# Patient Record
Sex: MALE | Race: WHITE | NOT HISPANIC OR LATINO | URBAN - METROPOLITAN AREA
[De-identification: names, ages, dates, MRNs, and addresses within clinical notes are randomized per-mention and may not be internally consistent; named-entity substitution may affect disease eponyms.]

---

## 2020-10-06 ENCOUNTER — OFFICE VISIT (OUTPATIENT)
Dept: URBAN - METROPOLITAN AREA CLINIC 23 | Facility: CLINIC | Age: 80
End: 2020-10-06
Payer: MEDICARE

## 2020-10-06 DIAGNOSIS — K51.00 ULCERATIVE CHRONIC PANCOLITIS: ICD-10-CM

## 2020-10-06 PROCEDURE — G8420 CALC BMI NORM PARAMETERS: HCPCS | Performed by: INTERNAL MEDICINE

## 2020-10-06 PROCEDURE — G8482 FLU IMMUNIZE ORDER/ADMIN: HCPCS | Performed by: INTERNAL MEDICINE

## 2020-10-06 PROCEDURE — 99213 OFFICE O/P EST LOW 20 MIN: CPT | Performed by: INTERNAL MEDICINE

## 2020-10-06 PROCEDURE — G8427 DOCREV CUR MEDS BY ELIG CLIN: HCPCS | Performed by: INTERNAL MEDICINE

## 2020-10-06 PROCEDURE — G9903 PT SCRN TBCO ID AS NON USER: HCPCS | Performed by: INTERNAL MEDICINE

## 2020-10-06 RX ORDER — ALFUZOSIN HYDROCHLORIDE 10 MG/1
1 TABLET IMMEDIATELY AFTER THE SAME MEAL TABLET, FILM COATED, EXTENDED RELEASE ORAL ONCE A DAY
Status: ACTIVE | COMMUNITY

## 2020-10-06 RX ORDER — TRAMADOL HYDROCHLORIDE 50 MG/1
TABLET ORAL
Qty: 0 | Refills: 0 | Status: ACTIVE | COMMUNITY
Start: 1900-01-01

## 2020-10-06 RX ORDER — METOPROLOL SUCCINATE 50 MG/1
TABLET, EXTENDED RELEASE ORAL
Qty: 0 | Refills: 0 | Status: ON HOLD | COMMUNITY
Start: 1900-01-01

## 2020-10-06 RX ORDER — ALFUZOSIN HYDROCHLORIDE 10 MG/1
TABLET, EXTENDED RELEASE ORAL 1
Qty: 0 | Refills: 0 | Status: ON HOLD | COMMUNITY
Start: 1900-01-01

## 2020-10-06 RX ORDER — MERCAPTOPURINE 50 MG/1
TAKE 75 MG TABLET ORAL ONCE A DAY
Qty: 135 | Refills: 3

## 2020-10-06 RX ORDER — PRAVASTATIN SODIUM 40 MG/1
TABLET ORAL
Qty: 0 | Refills: 0 | Status: ACTIVE | COMMUNITY
Start: 1900-01-01

## 2020-10-06 RX ORDER — FLUTICASONE PROPIONATE 44 UG/1
AEROSOL, METERED RESPIRATORY (INHALATION)
Qty: 0 | Refills: 0 | Status: ACTIVE | COMMUNITY
Start: 1900-01-01

## 2020-10-06 NOTE — HPI-TODAY'S VISIT:
80-years-old male with history of ulcerative colitis and micro perforation presented for 1 year follow up, clinically he is doing well he reports he has recent lab by Dr. Hui office 3 months ago revealed mild increase in his creatinine, his disease well controlled on 6 mercaptopurine 75 mg a day, he has 2 bowel movements a day no abdominal pain no nausea no vomiting. Last colonoscopy was 5 years ago but he does not want to do it because of concern about risk of the procedure  He understands risk of cancer,

## 2020-10-07 ENCOUNTER — TELEPHONE ENCOUNTER (OUTPATIENT)
Dept: URBAN - METROPOLITAN AREA CLINIC 92 | Facility: CLINIC | Age: 80
End: 2020-10-07

## 2020-10-07 ENCOUNTER — LAB OUTSIDE AN ENCOUNTER (OUTPATIENT)
Dept: URBAN - METROPOLITAN AREA CLINIC 92 | Facility: CLINIC | Age: 80
End: 2020-10-07

## 2020-10-07 LAB
A/G RATIO: 1.9
ALBUMIN: 4.6
ALKALINE PHOSPHATASE: 70
ALT (SGPT): 12
AST (SGOT): 21
BASO (ABSOLUTE): 0
BASOS: 1
BILIRUBIN, TOTAL: 0.6
BUN/CREATININE RATIO: 19
BUN: 25
CALCIUM: 9.8
CARBON DIOXIDE, TOTAL: 26
CHLORIDE: 102
CREATININE: 1.29
EGFR IF AFRICN AM: 59
EGFR IF NONAFRICN AM: 51
EOS (ABSOLUTE): 0.1
EOS: 1
GLOBULIN, TOTAL: 2.4
GLUCOSE: 90
HEMATOCRIT: 36.5
HEMATOLOGY COMMENTS:: (no result)
HEMOGLOBIN: 12.8
IMMATURE CELLS: (no result)
IMMATURE GRANS (ABS): 0
IMMATURE GRANULOCYTES: 0
LYMPHS (ABSOLUTE): 1.1
LYMPHS: 20
MCH: 34.6
MCHC: 35.1
MCV: 99
MONOCYTES(ABSOLUTE): 0.5
MONOCYTES: 10
NEUTROPHILS (ABSOLUTE): 3.8
NEUTROPHILS: 68
NRBC: (no result)
PLATELETS: 222
POTASSIUM: 4.8
PROTEIN, TOTAL: 7
RBC: 3.7
RDW: 14.3
SODIUM: 138
WBC: 5.6

## 2020-10-08 ENCOUNTER — DASHBOARD ENCOUNTERS (OUTPATIENT)
Age: 80
End: 2020-10-08

## 2020-10-10 ENCOUNTER — ERX REFILL RESPONSE (OUTPATIENT)
Age: 80
End: 2020-10-10

## 2020-10-10 RX ORDER — MERCAPTOPURINE 50 MG/1
TAKE 1.5 TABLETS BY ORAL ROUTE DAILY TABLET ORAL
Qty: 135 | Refills: 5

## 2020-10-20 ENCOUNTER — OFFICE VISIT (OUTPATIENT)
Dept: URBAN - METROPOLITAN AREA CLINIC 23 | Facility: CLINIC | Age: 80
End: 2020-10-20

## 2021-02-04 LAB
A/G RATIO: 1.9
ALBUMIN: 4.5
ALKALINE PHOSPHATASE: 79
AST (SGOT): 21
BASO (ABSOLUTE): 0
BASOS: 1
BILIRUBIN, TOTAL: 0.8
BUN/CREATININE RATIO: 20
BUN: 26
CALCIUM: 9.6
CARBON DIOXIDE, TOTAL: 27
CHLORIDE: 101
CREATININE: 1.27
EGFR IF AFRICN AM: 60
EGFR IF NONAFRICN AM: 52
EOS (ABSOLUTE): 0.1
EOS: 3
GLOBULIN, TOTAL: 2.4
GLUCOSE: 64
HEMATOCRIT: 38.3
HEMATOLOGY COMMENTS:: (no result)
HEMOGLOBIN: 12.7
IMMATURE CELLS: (no result)
IMMATURE GRANS (ABS): 0
IMMATURE GRANULOCYTES: 0
LYMPHS (ABSOLUTE): 1
LYMPHS: 22
MCH: 32.9
MCHC: 33.2
MCV: 99
MONOCYTES(ABSOLUTE): 0.5
MONOCYTES: 12
NEUTROPHILS (ABSOLUTE): 2.8
NEUTROPHILS: 62
NRBC: (no result)
PLATELETS: 215
POTASSIUM: 4.3
PROTEIN, TOTAL: 6.9
RBC: 3.86
RDW: 14.1
SODIUM: 141
WBC: 4.5

## 2021-10-27 ENCOUNTER — ERX REFILL RESPONSE (OUTPATIENT)
Dept: URBAN - METROPOLITAN AREA CLINIC 23 | Facility: CLINIC | Age: 81
End: 2021-10-27

## 2021-10-27 RX ORDER — MERCAPTOPURINE 50 MG/1
TAKE 1.5 TABLETS BY ORAL ROUTE ONCE DAILY TABLET ORAL
Qty: 135 TABLET | Refills: 6 | OUTPATIENT

## 2021-10-27 RX ORDER — MERCAPTOPURINE 50 MG/1
TAKE 1.5 TABLETS BY ORAL ROUTE DAILY TABLET ORAL
Qty: 135 | Refills: 5 | OUTPATIENT

## 2022-11-08 ENCOUNTER — TELEPHONE ENCOUNTER (OUTPATIENT)
Dept: URBAN - METROPOLITAN AREA CLINIC 23 | Facility: CLINIC | Age: 82
End: 2022-11-08

## 2022-11-08 RX ORDER — MERCAPTOPURINE 50 MG/1
TAKE 1.5 TABLETS BY ORAL ROUTE ONCE DAILY TABLET ORAL
Qty: 135 TABLET | Refills: 6

## 2023-01-20 ENCOUNTER — OFFICE VISIT (OUTPATIENT)
Dept: URBAN - METROPOLITAN AREA CLINIC 23 | Facility: CLINIC | Age: 83
End: 2023-01-20
Payer: MEDICARE

## 2023-01-20 VITALS
DIASTOLIC BLOOD PRESSURE: 71 MMHG | TEMPERATURE: 97.5 F | WEIGHT: 146 LBS | HEART RATE: 77 BPM | SYSTOLIC BLOOD PRESSURE: 126 MMHG | HEIGHT: 71 IN | BODY MASS INDEX: 20.44 KG/M2

## 2023-01-20 DIAGNOSIS — K51.00 ULCERATIVE CHRONIC PANCOLITIS: ICD-10-CM

## 2023-01-20 PROBLEM — 34000006 CROHN DISEASE: Status: ACTIVE | Noted: 2020-10-06

## 2023-01-20 PROCEDURE — 99213 OFFICE O/P EST LOW 20 MIN: CPT | Performed by: INTERNAL MEDICINE

## 2023-01-20 RX ORDER — TRAMADOL HYDROCHLORIDE 50 MG/1
TABLET ORAL
Qty: 0 | Refills: 0 | Status: ACTIVE | COMMUNITY
Start: 1900-01-01

## 2023-01-20 RX ORDER — PRAVASTATIN SODIUM 40 MG/1
TABLET ORAL
Qty: 0 | Refills: 0 | Status: ACTIVE | COMMUNITY
Start: 1900-01-01

## 2023-01-20 RX ORDER — METOPROLOL SUCCINATE 50 MG/1
TABLET, EXTENDED RELEASE ORAL
Qty: 0 | Refills: 0 | Status: ON HOLD | COMMUNITY
Start: 1900-01-01

## 2023-01-20 RX ORDER — FLUTICASONE PROPIONATE 44 UG/1
AEROSOL, METERED RESPIRATORY (INHALATION)
Qty: 0 | Refills: 0 | Status: ACTIVE | COMMUNITY
Start: 1900-01-01

## 2023-01-20 RX ORDER — ALFUZOSIN HYDROCHLORIDE 10 MG/1
1 TABLET IMMEDIATELY AFTER THE SAME MEAL TABLET, FILM COATED, EXTENDED RELEASE ORAL ONCE A DAY
Status: ACTIVE | COMMUNITY

## 2023-01-20 RX ORDER — MERCAPTOPURINE 50 MG/1
TAKE 1.5 TABLETS BY ORAL ROUTE ONCE DAILY TABLET ORAL
Qty: 135 TABLET | Refills: 6 | Status: ACTIVE | COMMUNITY

## 2023-01-20 RX ORDER — ALFUZOSIN HYDROCHLORIDE 10 MG/1
TABLET, EXTENDED RELEASE ORAL 1
Qty: 0 | Refills: 0 | Status: ON HOLD | COMMUNITY
Start: 1900-01-01

## 2023-01-20 NOTE — HPI-TODAY'S VISIT:
83-years-old male with history of ulcerative colitis and micro perforation presented for 1 year follow up, clinically he is doing well he has has 1 bowel movement a day.  He had recent labs in December 2022 at Dr. lazo office, his liver enzymes are normal his hemoglobin 12.5 mild anemia.  He is on mercaptopurine 75 mg a day and he has been on it for years.  . Last colonoscopy was 7 years ago but he does not want to do it because of concern about risk of the procedure  He understands risk of missing colon  cancer,

## 2023-01-22 PROBLEM — 442159003 CHRONIC ULCERATIVE PANCOLITIS: Status: ACTIVE | Noted: 2020-10-08

## 2024-01-25 ENCOUNTER — ERX REFILL RESPONSE (OUTPATIENT)
Dept: URBAN - METROPOLITAN AREA CLINIC 23 | Facility: CLINIC | Age: 84
End: 2024-01-25

## 2024-01-25 RX ORDER — MERCAPTOPURINE 50 MG/1
TAKE 1.5 TABLETS BY ORAL ROUTE ONCE DAILY TABLET ORAL
Qty: 135 TABLET | Refills: 6 | OUTPATIENT

## 2024-01-26 ENCOUNTER — TELEPHONE ENCOUNTER (OUTPATIENT)
Dept: URBAN - METROPOLITAN AREA CLINIC 23 | Facility: CLINIC | Age: 84
End: 2024-01-26

## 2024-01-26 RX ORDER — MERCAPTOPURINE 50 MG/1
TAKE 1.5 TABLETS BY ORAL ROUTE ONCE DAILY TABLET ORAL ONCE A DAY
Qty: 90 | Refills: 1

## 2024-12-12 ENCOUNTER — TELEPHONE ENCOUNTER (OUTPATIENT)
Dept: URBAN - METROPOLITAN AREA CLINIC 23 | Facility: CLINIC | Age: 84
End: 2024-12-12

## 2024-12-12 RX ORDER — MERCAPTOPURINE 50 MG/1
TAKE 1.5 TABLETS TABLET ORAL ONCE A DAY
Qty: 135 TABLET | Refills: 3